# Patient Record
Sex: MALE | Race: OTHER | HISPANIC OR LATINO
[De-identification: names, ages, dates, MRNs, and addresses within clinical notes are randomized per-mention and may not be internally consistent; named-entity substitution may affect disease eponyms.]

---

## 2017-06-11 ENCOUNTER — TRANSCRIPTION ENCOUNTER (OUTPATIENT)
Age: 37
End: 2017-06-11

## 2017-08-13 ENCOUNTER — EMERGENCY (EMERGENCY)
Facility: HOSPITAL | Age: 37
LOS: 1 days | Discharge: HOME | End: 2017-08-13
Attending: EMERGENCY MEDICINE
Payer: COMMERCIAL

## 2017-08-13 VITALS
OXYGEN SATURATION: 96 % | DIASTOLIC BLOOD PRESSURE: 84 MMHG | RESPIRATION RATE: 16 BRPM | SYSTOLIC BLOOD PRESSURE: 137 MMHG | TEMPERATURE: 98 F | HEART RATE: 92 BPM

## 2017-08-13 VITALS
OXYGEN SATURATION: 98 % | HEIGHT: 73 IN | SYSTOLIC BLOOD PRESSURE: 143 MMHG | WEIGHT: 279.99 LBS | TEMPERATURE: 99 F | DIASTOLIC BLOOD PRESSURE: 99 MMHG | HEART RATE: 95 BPM | RESPIRATION RATE: 20 BRPM

## 2017-08-13 PROCEDURE — 73590 X-RAY EXAM OF LOWER LEG: CPT

## 2017-08-13 PROCEDURE — 73590 X-RAY EXAM OF LOWER LEG: CPT | Mod: 26,LT

## 2017-08-13 PROCEDURE — 73030 X-RAY EXAM OF SHOULDER: CPT

## 2017-08-13 PROCEDURE — 71045 X-RAY EXAM CHEST 1 VIEW: CPT

## 2017-08-13 PROCEDURE — 99284 EMERGENCY DEPT VISIT MOD MDM: CPT | Mod: 25

## 2017-08-13 PROCEDURE — 96372 THER/PROPH/DIAG INJ SC/IM: CPT

## 2017-08-13 PROCEDURE — 71010: CPT | Mod: 26

## 2017-08-13 PROCEDURE — 73030 X-RAY EXAM OF SHOULDER: CPT | Mod: 26,LT

## 2017-08-13 PROCEDURE — 99284 EMERGENCY DEPT VISIT MOD MDM: CPT

## 2017-08-13 RX ORDER — METFORMIN HYDROCHLORIDE 850 MG/1
0 TABLET ORAL
Qty: 0 | Refills: 0 | COMMUNITY

## 2017-08-13 RX ORDER — KETOROLAC TROMETHAMINE 30 MG/ML
15 SYRINGE (ML) INJECTION ONCE
Qty: 0 | Refills: 0 | Status: DISCONTINUED | OUTPATIENT
Start: 2017-08-13 | End: 2017-08-13

## 2017-08-13 RX ORDER — TRAMADOL HYDROCHLORIDE 50 MG/1
1 TABLET ORAL
Qty: 18 | Refills: 0 | OUTPATIENT
Start: 2017-08-13 | End: 2017-08-16

## 2017-08-13 RX ADMIN — Medication 15 MILLIGRAM(S): at 11:45

## 2017-08-13 NOTE — ED ADULT NURSE NOTE - CHIEF COMPLAINT QUOTE
Pt reports he was at a [employer] game yesterday with his family and he fell down approx 9 concrete steps. Pt braced his fall with his left side, c/o left shoulder and arm pain. Pt denies hitting head, denies blood thinners, denies LOC. Pt with no obvious head injury. Abrasion to left shin.

## 2017-08-13 NOTE — ED PROVIDER NOTE - MUSCULOSKELETAL, MLM
Spine appears normal, range of motion is not limited, no muscle or joint tendernessexcept tenderness over his left ac joint he can range with encouragement and laft lateral lower leg diffuse large abrasion no cellulitis

## 2017-08-13 NOTE — ED PROVIDER NOTE - OBJECTIVE STATEMENT
36 y/o male with ess no pmh states he was well until 36 y/o male with ess no pmh states he was well until yesterday he says he tripped and fell down about 9 steps and he put his left arm out to break his fall and c/o pain over his shoulder most prominent over his distal clavicle and it hurts to raise his arm as well as a large abrasion over his left lower leg no weakness or numbness and no head or neck injury

## 2017-08-13 NOTE — ED PROVIDER NOTE - CHPI ED SYMPTOMS NEG
no tingling/no loss of consciousness/no confusion/no deformity/no vomiting/no weakness/no bleeding/no numbness/no fever

## 2017-08-13 NOTE — ED PROVIDER NOTE - CARE PLAN
Principal Discharge DX:	Shoulder strain, left, initial encounter  Secondary Diagnosis:	Abrasion of left lower extremity, initial encounter

## 2017-08-13 NOTE — ED ADULT TRIAGE NOTE - CHIEF COMPLAINT QUOTE
Pt reports he was at a [employer] game yesterday with his family and he fell down approx 9 concrete steps. Pt braced his fall with his left side, c/o let shoulder and arm pain. Pt denies hitting head, denies blood thinners, denies LOC. Pt with no obvious head injury. Abrasion to left shin. Pt reports he was at a [employer] game yesterday with his family and he fell down approx 9 concrete steps. Pt braced his fall with his left side, c/o left shoulder and arm pain. Pt denies hitting head, denies blood thinners, denies LOC. Pt with no obvious head injury. Abrasion to left shin.

## 2018-01-11 ENCOUNTER — TRANSCRIPTION ENCOUNTER (OUTPATIENT)
Age: 38
End: 2018-01-11

## 2018-10-04 ENCOUNTER — TRANSCRIPTION ENCOUNTER (OUTPATIENT)
Age: 38
End: 2018-10-04

## 2018-10-09 ENCOUNTER — RX RENEWAL (OUTPATIENT)
Age: 38
End: 2018-10-09

## 2019-01-17 ENCOUNTER — TRANSCRIPTION ENCOUNTER (OUTPATIENT)
Age: 39
End: 2019-01-17

## 2019-01-23 ENCOUNTER — EMERGENCY (EMERGENCY)
Facility: HOSPITAL | Age: 39
LOS: 1 days | Discharge: HOME | End: 2019-01-23
Attending: STUDENT IN AN ORGANIZED HEALTH CARE EDUCATION/TRAINING PROGRAM
Payer: COMMERCIAL

## 2019-01-23 VITALS
RESPIRATION RATE: 18 BRPM | HEART RATE: 107 BPM | WEIGHT: 270.07 LBS | DIASTOLIC BLOOD PRESSURE: 89 MMHG | HEIGHT: 73 IN | OXYGEN SATURATION: 98 % | SYSTOLIC BLOOD PRESSURE: 135 MMHG | TEMPERATURE: 98 F

## 2019-01-23 PROBLEM — E11.9 TYPE 2 DIABETES MELLITUS WITHOUT COMPLICATIONS: Chronic | Status: ACTIVE | Noted: 2017-08-13

## 2019-01-23 PROCEDURE — 99283 EMERGENCY DEPT VISIT LOW MDM: CPT | Mod: 25

## 2019-01-23 PROCEDURE — 99283 EMERGENCY DEPT VISIT LOW MDM: CPT

## 2019-01-23 PROCEDURE — 73030 X-RAY EXAM OF SHOULDER: CPT | Mod: 26,LT

## 2019-01-23 PROCEDURE — 99053 MED SERV 10PM-8AM 24 HR FAC: CPT

## 2019-01-23 PROCEDURE — 73030 X-RAY EXAM OF SHOULDER: CPT

## 2019-01-23 RX ORDER — METFORMIN HYDROCHLORIDE 850 MG/1
0 TABLET ORAL
Qty: 0 | Refills: 0 | COMMUNITY

## 2019-01-23 RX ORDER — METHOCARBAMOL 500 MG/1
750 TABLET, FILM COATED ORAL ONCE
Qty: 0 | Refills: 0 | Status: COMPLETED | OUTPATIENT
Start: 2019-01-23 | End: 2019-01-23

## 2019-01-23 RX ORDER — IBUPROFEN 200 MG
600 TABLET ORAL ONCE
Qty: 0 | Refills: 0 | Status: COMPLETED | OUTPATIENT
Start: 2019-01-23 | End: 2019-01-23

## 2019-01-23 RX ORDER — LIRAGLUTIDE 6 MG/ML
0 INJECTION SUBCUTANEOUS
Qty: 0 | Refills: 0 | COMMUNITY

## 2019-01-23 RX ORDER — OXYCODONE AND ACETAMINOPHEN 5; 325 MG/1; MG/1
1 TABLET ORAL ONCE
Qty: 0 | Refills: 0 | Status: DISCONTINUED | OUTPATIENT
Start: 2019-01-23 | End: 2019-01-23

## 2019-01-23 RX ADMIN — OXYCODONE AND ACETAMINOPHEN 1 TABLET(S): 5; 325 TABLET ORAL at 04:50

## 2019-01-23 RX ADMIN — Medication 600 MILLIGRAM(S): at 04:50

## 2019-01-23 RX ADMIN — METHOCARBAMOL 750 MILLIGRAM(S): 500 TABLET, FILM COATED ORAL at 04:50
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
## 2019-01-23 NOTE — ED PROVIDER NOTE - PROGRESS NOTE DETAILS
xray reviewed b myself and attending W/o any bony abnormalities- attending did not recommended sling - pt has private orthopedic - been told f/u Within 1-2 days

## 2019-01-23 NOTE — ED PROVIDER NOTE - CONSTITUTIONAL, MLM
normal... Well appearing, well nourished, awake, alert, oriented to person, place, time/situation and in mild apparent distress due to left shoulder pain
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
## 2019-01-23 NOTE — ED PROVIDER NOTE - PHYSICAL EXAMINATION
left shoulder : Ant lateral mild TTP - ROM decreased - radial pulse + 2 , decreased strengths compare to right  - hand  grossly intact - contour of the left shoulder grossly normal/ +Supra spinatus spams noted  no cervical - thoracic and lumbar spine midline TTP    gait normal speech normal
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
## 2019-06-13 ENCOUNTER — RESULT CHARGE (OUTPATIENT)
Age: 39
End: 2019-06-13

## 2019-06-13 ENCOUNTER — APPOINTMENT (OUTPATIENT)
Dept: ENDOCRINOLOGY | Facility: CLINIC | Age: 39
End: 2019-06-13
Payer: COMMERCIAL

## 2019-06-13 VITALS
HEIGHT: 73 IN | OXYGEN SATURATION: 99 % | WEIGHT: 280 LBS | DIASTOLIC BLOOD PRESSURE: 80 MMHG | HEART RATE: 89 BPM | SYSTOLIC BLOOD PRESSURE: 128 MMHG | BODY MASS INDEX: 37.11 KG/M2

## 2019-06-13 LAB — GLUCOSE BLDC GLUCOMTR-MCNC: 348

## 2019-06-13 PROCEDURE — 82962 GLUCOSE BLOOD TEST: CPT

## 2019-06-13 PROCEDURE — 99214 OFFICE O/P EST MOD 30 MIN: CPT | Mod: 25

## 2019-06-13 NOTE — REVIEW OF SYSTEMS
[Polyuria] : polyuria [Pain/Numbness of Digits] : pain/numbness of digits [Palpitations] : no palpitations [Chest Pain] : no chest pain [Shortness Of Breath] : no shortness of breath [Nausea] : no nausea [Vomiting] : no vomiting was observed [Polydipsia] : no polydipsia [Abdominal Pain] : no abdominal pain [de-identified] : numbness in left pinky toe

## 2019-06-13 NOTE — ASSESSMENT
[Self Monitoring of Blood Glucose] : self monitoring of blood glucose [Long Term Vascular Complications] : long term vascular complications of diabetes [FreeTextEntry1] : 39 year old male with uncontrolled T2DM, hypertension, and hyperlipidemia. Glycemic control is likely poor due to nonaherence with medication regimen and glucose monitoring.\par \par 1. T2DM\par -Reviewed risks and complications of uncontrolled diabetes in depth.\par -Restart Tresiba 10 units daily.\par -Must start SMBG 4x daily. Suggest use of Freestyle Patricia for better evaluation of glucose patterns and more consistent monitoring. Patient is hesitant (thinks device will fall off due to his physical). Will attempt use of device and if incompatible with his job, will resume fingersticks.\par -Check A1c now.\par -Continue Metformin.\par -Consider restarting GLP agonist or prandial insulin based on results of labs.\par \par 2. Hypertension- controlled.\par \par 3. Hyperlipidemia- not on statin.\par -Check lipids now.

## 2019-06-13 NOTE — HISTORY OF PRESENT ILLNESS
[FreeTextEntry1] : Type: 2\par Severity: uncontrolled\par Duration: diagnosed 2016\par Associated symptoms: hyperglycemia\par Modifying factors: worse due to nonadherence with medication\par \par Current meds for glycemic control:\par Metformin 1000 mg BID\par Victoza 1.8 mg daily- stopped when he started Tresiba \par Tresiba- cannot remember dose, stopped taking months ago\par SMBG only if feeling "weak", about 2-3x per week. 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

## 2019-06-13 NOTE — PHYSICAL EXAM
[Alert] : alert [No Acute Distress] : no acute distress [Well Developed] : well developed [Well Nourished] : well nourished [Normal Sclera/Conjunctiva] : normal sclera/conjunctiva [EOMI] : extra ocular movement intact [No Proptosis] : no proptosis [Normal Oropharynx] : the oropharynx was normal [Thyroid Not Enlarged] : the thyroid was not enlarged [No Thyroid Nodules] : there were no palpable thyroid nodules [No Accessory Muscle Use] : no accessory muscle use [No Respiratory Distress] : no respiratory distress [Clear to Auscultation] : lungs were clear to auscultation bilaterally [Normal S1, S2] : normal S1 and S2 [Normal Rate] : heart rate was normal  [Regular Rhythm] : with a regular rhythm [No Edema] : there was no peripheral edema [Acanthosis Nigricans] : acanthosis nigricans [Oriented x3] : oriented to person, place, and time [de-identified] : diaphoretic [de-identified] : patient appears indifferent, minimally participative in visit.

## 2019-06-14 ENCOUNTER — MEDICATION RENEWAL (OUTPATIENT)
Age: 39
End: 2019-06-14

## 2019-06-14 LAB
ALBUMIN SERPL ELPH-MCNC: 5.1 G/DL
ALP BLD-CCNC: 94 U/L
ALT SERPL-CCNC: 86 U/L
ANION GAP SERPL CALC-SCNC: 14 MMOL/L
AST SERPL-CCNC: 40 U/L
BASOPHILS # BLD AUTO: 0.06 K/UL
BASOPHILS NFR BLD AUTO: 0.7 %
BILIRUB SERPL-MCNC: 0.4 MG/DL
BUN SERPL-MCNC: 19 MG/DL
CALCIUM SERPL-MCNC: 10.2 MG/DL
CHLORIDE SERPL-SCNC: 97 MMOL/L
CHOLEST SERPL-MCNC: 212 MG/DL
CHOLEST/HDLC SERPL: 5.2 RATIO
CO2 SERPL-SCNC: 24 MMOL/L
CREAT SERPL-MCNC: 0.76 MG/DL
CREAT SPEC-SCNC: 71 MG/DL
EOSINOPHIL # BLD AUTO: 0.3 K/UL
EOSINOPHIL NFR BLD AUTO: 3.3 %
ESTIMATED AVERAGE GLUCOSE: 280 MG/DL
GLUCOSE SERPL-MCNC: 343 MG/DL
HBA1C MFR BLD HPLC: 11.4 %
HCT VFR BLD CALC: 46 %
HDLC SERPL-MCNC: 41 MG/DL
HGB BLD-MCNC: 15 G/DL
IMM GRANULOCYTES NFR BLD AUTO: 0.4 %
LDLC SERPL CALC-MCNC: 134 MG/DL
LYMPHOCYTES # BLD AUTO: 2.66 K/UL
LYMPHOCYTES NFR BLD AUTO: 29 %
MAN DIFF?: NORMAL
MCHC RBC-ENTMCNC: 30.3 PG
MCHC RBC-ENTMCNC: 32.6 GM/DL
MCV RBC AUTO: 92.9 FL
MICROALBUMIN 24H UR DL<=1MG/L-MCNC: 27.5 MG/DL
MICROALBUMIN/CREAT 24H UR-RTO: 389 MG/G
MONOCYTES # BLD AUTO: 0.78 K/UL
MONOCYTES NFR BLD AUTO: 8.5 %
NEUTROPHILS # BLD AUTO: 5.34 K/UL
NEUTROPHILS NFR BLD AUTO: 58.1 %
PLATELET # BLD AUTO: 284 K/UL
POTASSIUM SERPL-SCNC: 4.6 MMOL/L
PROT SERPL-MCNC: 8.4 G/DL
RBC # BLD: 4.95 M/UL
RBC # FLD: 13.3 %
SODIUM SERPL-SCNC: 135 MMOL/L
TRIGL SERPL-MCNC: 183 MG/DL
TSH SERPL-ACNC: 1.22 UIU/ML
WBC # FLD AUTO: 9.18 K/UL

## 2019-07-11 NOTE — ED PROVIDER NOTE - CONDUCTED A DETAILED DISCUSSION WITH PATIENT AND/OR GUARDIAN REGARDING, MDM
Your current Orthopaedic problem we are working together to treat is pain.    - PLEASE CONTACT OFFICE BEFORE STOPPING ANY MEDICATION PRESCRIBED BY DR. BRIONES.   - Please allow 72 hours for all refill requests.  We will not refill any pain medicine after business hours or on weekends. Thank you!    It is recommended you schedule a follow-up appointment with Vladislav Briones, DO in 2 months    We do highly recommend [facility], if you do not already have this. You can request access via the internet or by simply talking with a  at any of the clinics.   [email or web address].    Office hours are 8:00 am to 5:00 pm Monday through Friday. If it is urgent that you speak with someone outside of these hours, our [facility] Call Center will be able to assist you.   If you have any questions or concerns prior to your next office visit, please call our Pain Line: [phone].     Thank you for choosing Dr. Vladislav Briones as your [facility] Pain Management provider!               
f/u with ortho ( private )/radiology results/need for outpatient follow-up

## 2019-07-23 ENCOUNTER — APPOINTMENT (OUTPATIENT)
Dept: ENDOCRINOLOGY | Facility: CLINIC | Age: 39
End: 2019-07-23

## 2019-07-31 ENCOUNTER — TRANSCRIPTION ENCOUNTER (OUTPATIENT)
Age: 39
End: 2019-07-31

## 2019-08-30 ENCOUNTER — APPOINTMENT (OUTPATIENT)
Dept: ENDOCRINOLOGY | Facility: CLINIC | Age: 39
End: 2019-08-30

## 2019-09-11 ENCOUNTER — RX RENEWAL (OUTPATIENT)
Age: 39
End: 2019-09-11

## 2019-10-21 ENCOUNTER — TRANSCRIPTION ENCOUNTER (OUTPATIENT)
Age: 39
End: 2019-10-21

## 2019-11-01 ENCOUNTER — APPOINTMENT (OUTPATIENT)
Dept: ENDOCRINOLOGY | Facility: CLINIC | Age: 39
End: 2019-11-01

## 2019-12-10 ENCOUNTER — EMERGENCY (EMERGENCY)
Facility: HOSPITAL | Age: 39
LOS: 1 days | Discharge: HOME | End: 2019-12-10
Attending: EMERGENCY MEDICINE
Payer: COMMERCIAL

## 2019-12-10 VITALS
WEIGHT: 270.07 LBS | OXYGEN SATURATION: 97 % | DIASTOLIC BLOOD PRESSURE: 98 MMHG | HEIGHT: 73 IN | SYSTOLIC BLOOD PRESSURE: 161 MMHG | HEART RATE: 104 BPM | RESPIRATION RATE: 20 BRPM | TEMPERATURE: 100 F

## 2019-12-10 VITALS
SYSTOLIC BLOOD PRESSURE: 147 MMHG | HEART RATE: 92 BPM | OXYGEN SATURATION: 97 % | DIASTOLIC BLOOD PRESSURE: 100 MMHG | RESPIRATION RATE: 20 BRPM | TEMPERATURE: 100 F

## 2019-12-10 PROCEDURE — 99284 EMERGENCY DEPT VISIT MOD MDM: CPT | Mod: 25

## 2019-12-10 PROCEDURE — 96372 THER/PROPH/DIAG INJ SC/IM: CPT

## 2019-12-10 PROCEDURE — 99283 EMERGENCY DEPT VISIT LOW MDM: CPT

## 2019-12-10 PROCEDURE — 73610 X-RAY EXAM OF ANKLE: CPT | Mod: 26,LT

## 2019-12-10 PROCEDURE — 99053 MED SERV 10PM-8AM 24 HR FAC: CPT

## 2019-12-10 PROCEDURE — 73590 X-RAY EXAM OF LOWER LEG: CPT | Mod: 26,LT

## 2019-12-10 PROCEDURE — 73630 X-RAY EXAM OF FOOT: CPT

## 2019-12-10 PROCEDURE — 73590 X-RAY EXAM OF LOWER LEG: CPT

## 2019-12-10 PROCEDURE — 73610 X-RAY EXAM OF ANKLE: CPT

## 2019-12-10 PROCEDURE — 73630 X-RAY EXAM OF FOOT: CPT | Mod: 26,LT

## 2019-12-10 RX ORDER — KETOROLAC TROMETHAMINE 30 MG/ML
60 SYRINGE (ML) INJECTION ONCE
Refills: 0 | Status: DISCONTINUED | OUTPATIENT
Start: 2019-12-10 | End: 2019-12-10

## 2019-12-10 RX ADMIN — Medication 60 MILLIGRAM(S): at 08:16

## 2019-12-10 NOTE — ED PROVIDER NOTE - CARE PROVIDER_API CALL
Christian Murcia (DPM)  Podiatric Medicine and Surgery  [address] [place] [address]  Phone: [phone]  Fax: [phone]  Follow Up Time:

## 2019-12-10 NOTE — ED STATDOCS - OBJECTIVE STATEMENT
40 y/o M pt with significant PMHx of DM presents to the ED c/o left ankle pain s/p trauma. 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 Negative LOC, negative Head trauma. Patient currently taking Metformin for his DM. Denies HA, SOB, fever. No further acute complaints at this time.

## 2019-12-10 NOTE — ED PROVIDER NOTE - PHYSICAL EXAMINATION
Constitutional - well-developed; well nourished. Head - NCAT. Airway patent. Eyes - PERRL. CV - RRR. no murmur. no edema. Pulm - CTAB. Abd - soft, nt. no rebound. no guarding. Neuro - A&Ox3. strength 5/5 x4. sensation intact x4. normal gait. Skin - No rash. MSK - limited ROM, +TTP along base of L 1st digit/second digit and third digit, no echymosis, minimal soft tissue swelling, DP and PT pulses intact, no ankle tenderness, no calf tenderness or knee tenderness, skin warm and dry.
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
## 2019-12-10 NOTE — ED PROVIDER NOTE - PATIENT PORTAL LINK FT
You can access the FollowMyHealth Patient Portal offered by [facility] by registering at the following website: [email or web address]. By joining [facility]’s FollowMyHealth portal, you will also be able to view your health information using other applications (apps) compatible with our system.

## 2019-12-10 NOTE — ED STATDOCS - ATTENDING CONTRIBUTION TO CARE
I, Brandon Mendoza, performed the initial face to face bedside interview with this patient regarding history of present illness, review of symptoms and relevant past medical, social and family history.  I completed an independent physical examination.  I was the initial provider who evaluated this patient. I have signed out the follow up of any pending tests (i.e. labs, radiological studies) to the ACP.  I have communicated the patient’s plan of care and disposition with the ACP.  The history, relevant review of systems, past medical and surgical history, medical decision making, and physical examination was documented by the scribe in my presence and I attest to the accuracy of the documentation.

## 2019-12-10 NOTE — ED ADULT NURSE NOTE - NSIMPLEMENTINTERV_GEN_ALL_ED
Implemented All Fall Risk Interventions:  [place] to call system. Call bell, personal items and telephone within reach. Instruct patient to call for assistance. Room bathroom lighting operational. Non-slip footwear when patient is off stretcher. Physically safe environment: no spills, clutter or unnecessary equipment. Stretcher in lowest position, wheels locked, appropriate side rails in place. Provide visual cue, wrist band, yellow gown, etc. Monitor gait and stability. Monitor for mental status changes and reorient to person, place, and time. Review medications for side effects contributing to fall risk. Reinforce activity limits and safety measures with patient and family.

## 2019-12-10 NOTE — ED PROVIDER NOTE - ATTENDING CONTRIBUTION TO CARE
The patient seen and examined    Ankle/foot pain    I, Brandon Mendoza, performed the initial face to face bedside interview with this patient regarding history of present illness, review of symptoms and relevant past medical, social and family history.  I completed an independent physical examination.  I was the initial provider who evaluated this patient. I have signed out the follow up of any pending tests (i.e. labs, radiological studies) to the ACP.  I have communicated the patient’s plan of care and disposition with the ACP.

## 2019-12-10 NOTE — ED PROVIDER NOTE - OBJECTIVE STATEMENT
This is a 39 year old male with c/o L LE pain.  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  He notes during the interim his steel toe boot was caught.  He notes twisted his foot.  He reports was able to walk on his heel.  He was sent home early from work.  He reports drove himself to ED.  He denies taking any prior injury to LE.  He denies using ice or taking any pain medication.

## 2019-12-10 NOTE — ED ADULT TRIAGE NOTE - CHIEF COMPLAINT QUOTE
patient states at work on a high/low got foot caught on palette twisting foot backwards complaining of from ankle to calve, difficulty walking

## 2019-12-10 NOTE — ED PROVIDER NOTE - NS ED ROS FT
No fever/chills, No photophobia/eye pain/changes in vision, No ear pain/sore throat/dysphagia, No chest pain/palpitations, no SOB/cough/wheeze/stridor, No abdominal pain, No N/V/D, no dysuria/frequency/discharge, +L pain, No neck/back pain, no rash, no changes in neurological status/function.

## 2019-12-10 NOTE — ED STATDOCS - MUSCULOSKELETAL, MLM
(+) Tenderness over left malleolus. (+) Tenderness to palpation over the left first metatarsal bone. range of motion is not limited

## 2020-06-25 ENCOUNTER — APPOINTMENT (OUTPATIENT)
Dept: INTERNAL MEDICINE | Facility: CLINIC | Age: 40
End: 2020-06-25
Payer: COMMERCIAL

## 2020-06-25 ENCOUNTER — NON-APPOINTMENT (OUTPATIENT)
Age: 40
End: 2020-06-25

## 2020-06-25 VITALS
SYSTOLIC BLOOD PRESSURE: 142 MMHG | HEART RATE: 107 BPM | DIASTOLIC BLOOD PRESSURE: 88 MMHG | OXYGEN SATURATION: 97 % | HEIGHT: 73 IN | WEIGHT: 262 LBS | RESPIRATION RATE: 14 BRPM | BODY MASS INDEX: 34.72 KG/M2

## 2020-06-25 DIAGNOSIS — G89.29 PAIN IN LEFT SHOULDER: ICD-10-CM

## 2020-06-25 DIAGNOSIS — M25.512 PAIN IN LEFT SHOULDER: ICD-10-CM

## 2020-06-25 DIAGNOSIS — Z00.00 ENCOUNTER FOR GENERAL ADULT MEDICAL EXAMINATION W/OUT ABNORMAL FINDINGS: ICD-10-CM

## 2020-06-25 DIAGNOSIS — F17.200 NICOTINE DEPENDENCE, UNSPECIFIED, UNCOMPLICATED: ICD-10-CM

## 2020-06-25 DIAGNOSIS — F31.9 BIPOLAR DISORDER, UNSPECIFIED: ICD-10-CM

## 2020-06-25 DIAGNOSIS — R68.82 DECREASED LIBIDO: ICD-10-CM

## 2020-06-25 DIAGNOSIS — Z83.3 FAMILY HISTORY OF DIABETES MELLITUS: ICD-10-CM

## 2020-06-25 DIAGNOSIS — N52.9 MALE ERECTILE DYSFUNCTION, UNSPECIFIED: ICD-10-CM

## 2020-06-25 DIAGNOSIS — Z82.0 FAMILY HISTORY OF EPILEPSY AND OTHER DISEASES OF THE NERVOUS SYSTEM: ICD-10-CM

## 2020-06-25 PROCEDURE — 93000 ELECTROCARDIOGRAM COMPLETE: CPT | Mod: 59

## 2020-06-25 PROCEDURE — 99386 PREV VISIT NEW AGE 40-64: CPT | Mod: 25

## 2020-06-25 PROCEDURE — G0442 ANNUAL ALCOHOL SCREEN 15 MIN: CPT

## 2020-06-25 PROCEDURE — 36415 COLL VENOUS BLD VENIPUNCTURE: CPT

## 2020-06-25 RX ORDER — MIRTAZAPINE 15 MG/1
15 TABLET, FILM COATED ORAL
Refills: 0 | Status: ACTIVE | COMMUNITY
Start: 2020-06-25

## 2020-06-26 LAB
25(OH)D3 SERPL-MCNC: 14.5 NG/ML
ALBUMIN SERPL ELPH-MCNC: 5 G/DL
ALP BLD-CCNC: 148 U/L
ALT SERPL-CCNC: 73 U/L
ANION GAP SERPL CALC-SCNC: 17 MMOL/L
AST SERPL-CCNC: 31 U/L
BASOPHILS # BLD AUTO: 0.05 K/UL
BASOPHILS NFR BLD AUTO: 0.6 %
BILIRUB SERPL-MCNC: 0.5 MG/DL
BUN SERPL-MCNC: 13 MG/DL
CALCIUM SERPL-MCNC: 10.5 MG/DL
CHLORIDE SERPL-SCNC: 95 MMOL/L
CHOLEST SERPL-MCNC: 221 MG/DL
CHOLEST/HDLC SERPL: 5.3 RATIO
CO2 SERPL-SCNC: 24 MMOL/L
CREAT SERPL-MCNC: 0.74 MG/DL
CREAT SPEC-SCNC: 41 MG/DL
EOSINOPHIL # BLD AUTO: 0.16 K/UL
EOSINOPHIL NFR BLD AUTO: 1.8 %
ESTIMATED AVERAGE GLUCOSE: 329 MG/DL
GLUCOSE SERPL-MCNC: 470 MG/DL
HBA1C MFR BLD HPLC: 13.1 %
HCT VFR BLD CALC: 49.9 %
HDLC SERPL-MCNC: 41 MG/DL
HGB BLD-MCNC: 15.9 G/DL
IMM GRANULOCYTES NFR BLD AUTO: 0.2 %
LDLC SERPL CALC-MCNC: 113 MG/DL
LYMPHOCYTES # BLD AUTO: 2.43 K/UL
LYMPHOCYTES NFR BLD AUTO: 28 %
MAN DIFF?: NORMAL
MCHC RBC-ENTMCNC: 30.8 PG
MCHC RBC-ENTMCNC: 31.9 GM/DL
MCV RBC AUTO: 96.5 FL
MICROALBUMIN 24H UR DL<=1MG/L-MCNC: 31.7 MG/DL
MICROALBUMIN/CREAT 24H UR-RTO: 778 MG/G
MONOCYTES # BLD AUTO: 0.71 K/UL
MONOCYTES NFR BLD AUTO: 8.2 %
NEUTROPHILS # BLD AUTO: 5.3 K/UL
NEUTROPHILS NFR BLD AUTO: 61.2 %
PLATELET # BLD AUTO: 284 K/UL
POTASSIUM SERPL-SCNC: 4.5 MMOL/L
PROT SERPL-MCNC: 8.1 G/DL
RBC # BLD: 5.17 M/UL
RBC # FLD: 12.9 %
SODIUM SERPL-SCNC: 135 MMOL/L
TRIGL SERPL-MCNC: 331 MG/DL
WBC # FLD AUTO: 8.67 K/UL

## 2020-06-26 RX ORDER — METFORMIN HYDROCHLORIDE 1000 MG/1
1000 TABLET, COATED ORAL
Qty: 60 | Refills: 0 | Status: ACTIVE | COMMUNITY
Start: 2019-06-14 | End: 1900-01-01

## 2020-06-26 NOTE — ASSESSMENT
[FreeTextEntry1] : -PMH: HTN, HLD, T2DM, Bipolar Depression\par -SH: Single. 1 child. . Current smoker. Occasional EtOH use. \par \par NADEEN is a 40 year M whom is here today for an annual well check and to establish care with a new PMD. \par Today, pt reports feeling well but he does mention low libido and difficulties maintaining and attaining an erection\par \par Follows with the following Physicians: \par -Endo: Dr. Bello ([phone])\par -Psych: Dr. Meraz ([phone])\par \par -Vaccines: Needs PPSV 23, TDap (Declines both at this time and will check records at home)\par \par EKG obtained in office today demonstrates NSR. Normal Axis/Intervals. Good R-wave progression. Normal EKG\par \par -F/u labs drawn in office today\par -Further recs pending lab results\par -Continue f/u w/ Endo (T2DM)\par -Continue f/u w/ Psych (Bipolar Depression)\par -RTO 3mo for routine f/u visit & labs & TDap/PPSV23, Optho ref, COnfirm med rec, Ortho ref

## 2020-06-26 NOTE — ADDENDUM
[FreeTextEntry1] : A1c 13.1\par CMP: Glucose 470, ALT 73, \par Urine Microalbumin 778\par Vit-D 14.5\par CBC WNL\par \par #1 Uncontrolled T2DM: A1c 13.1. Likely the cause of his erectile dysfunction. He NEEDS to f/u w/ his Endocrinologist to discuss med adjustments. I advised he needs to call them today. If has has difficulty getting in touch he is to call me by the end of the day so that we can make med adjustments. I have advised he increase his water consumption as elevated sugars can lead to dehydration\par \par #2 Proteinuria likely 2/2 uncontrolled T2DM. WIll continue to monitor and repeat in 3mo.\par \par #3 Vit-D Def: Recommend supplementation of Vit-D3 50,000iu Qwkly x 8 weeks THEN maintence w/ Vit-D3 2,000iu QD\par \par Recommend starting Atorvastatin 40mg HS & Lisinopril 10mg\par \par F/u in 1mo for BP check

## 2020-06-26 NOTE — HEALTH RISK ASSESSMENT
[Very Good] : ~his/her~  mood as very good [Yes] : Yes [None] : None [16-20] : 16-20 [Monthly or less (1 pt)] : Monthly or less (1 point) [Never (0 pts)] : Never (0 points) [1 or 2 (0 pts)] : 1 or 2 (0 points) [No] : In the past 12 months have you used drugs other than those required for medical reasons? No [0] : 1) Little interest or pleasure doing things: Not at all (0) [Hepatitis C test declined] : Hepatitis C test declined [HIV test declined] : HIV test declined [Employed] : employed [With Family] : lives with family [# Of Children ___] : has [unfilled] children [Sexually Active] : sexually active [Single] : single [Reviewed no changes] : Reviewed no changes [] : No [de-identified] : 1 cig per day [Audit-CScore] : 1 [[id number]] : 0 [Change in mental status noted] : No change in mental status noted [High Risk Behavior] : no high risk behavior [AdvancecareDate] : 06/20

## 2020-06-26 NOTE — HISTORY OF PRESENT ILLNESS
[FreeTextEntry1] : Annual well visit [de-identified] : -PMH: HTN, HLD, T2DM, Bipolar Depression\par -SH: Single. 2 children. . Current smoker. Occasional EtOH use. \par \par NADEEN is a 40 year M whom is here today for an annual well check and to establish care with a new PMD. \par Today, pt reports feeling well but he does mention low libido and difficulties maintaining and attaining an erection\par \par Follows with the following Physicians: \par -Endo: Dr. Bello ([phone])\par -Psych: Dr. Meraz ([phone])\par \par -Vaccines: Needs PPSV 23, TDap (Declines both at this time and will check records at home)\par \par -HTN: Not on medication at this time. No reported changes. \par -T2DM: Remains on Metformin 1000mg BID & Tresiba. Follows w/ Endo. Has not seen optho. No reported changes. \par -Bipolar Depression: Remains on Remeron 15mg QD. Follows w/ Psych. No reported changes.

## 2020-06-30 LAB
TESTOST BND SERPL-MCNC: 11.3 PG/ML
TESTOST SERPL-MCNC: 267.2 NG/DL

## 2020-08-13 ENCOUNTER — APPOINTMENT (OUTPATIENT)
Dept: ENDOCRINOLOGY | Facility: CLINIC | Age: 40
End: 2020-08-13
Payer: COMMERCIAL

## 2020-08-13 VITALS
BODY MASS INDEX: 35.39 KG/M2 | HEIGHT: 73 IN | WEIGHT: 267 LBS | DIASTOLIC BLOOD PRESSURE: 100 MMHG | SYSTOLIC BLOOD PRESSURE: 150 MMHG

## 2020-08-13 LAB
BASOPHILS # BLD AUTO: 0.05 K/UL
BASOPHILS NFR BLD AUTO: 0.6 %
EOSINOPHIL # BLD AUTO: 0.17 K/UL
EOSINOPHIL NFR BLD AUTO: 2 %
HCT VFR BLD CALC: 43.9 %
HGB BLD-MCNC: 14.3 G/DL
IMM GRANULOCYTES NFR BLD AUTO: 0.3 %
LYMPHOCYTES # BLD AUTO: 2.32 K/UL
LYMPHOCYTES NFR BLD AUTO: 26.9 %
MAN DIFF?: NORMAL
MCHC RBC-ENTMCNC: 30.2 PG
MCHC RBC-ENTMCNC: 32.6 GM/DL
MCV RBC AUTO: 92.6 FL
MONOCYTES # BLD AUTO: 0.75 K/UL
MONOCYTES NFR BLD AUTO: 8.7 %
NEUTROPHILS # BLD AUTO: 5.31 K/UL
NEUTROPHILS NFR BLD AUTO: 61.5 %
PLATELET # BLD AUTO: 259 K/UL
RBC # BLD: 4.74 M/UL
RBC # FLD: 12.7 %
WBC # FLD AUTO: 8.63 K/UL

## 2020-08-13 PROCEDURE — 99214 OFFICE O/P EST MOD 30 MIN: CPT

## 2020-08-13 RX ORDER — LISINOPRIL 10 MG/1
10 TABLET ORAL
Qty: 90 | Refills: 0 | Status: DISCONTINUED | COMMUNITY
Start: 2020-06-26 | End: 2020-08-13

## 2020-08-13 RX ORDER — ATORVASTATIN CALCIUM 40 MG/1
40 TABLET, FILM COATED ORAL
Qty: 90 | Refills: 1 | Status: DISCONTINUED | COMMUNITY
Start: 2020-06-26 | End: 2020-08-13

## 2020-08-13 RX ORDER — FLASH GLUCOSE SCANNING READER
EACH MISCELLANEOUS
Qty: 1 | Refills: 0 | Status: DISCONTINUED | COMMUNITY
Start: 2019-06-13 | End: 2020-08-13

## 2020-08-13 NOTE — PHYSICAL EXAM
[Well Nourished] : well nourished [Alert] : alert [Well Developed] : well developed [No Acute Distress] : no acute distress [No Proptosis] : no proptosis [Normal Sclera/Conjunctiva] : normal sclera/conjunctiva [EOMI] : extra ocular movement intact [Thyroid Not Enlarged] : the thyroid was not enlarged [No Thyroid Nodules] : no palpable thyroid nodules [No Accessory Muscle Use] : no accessory muscle use [No Respiratory Distress] : no respiratory distress [Clear to Auscultation] : lungs were clear to auscultation bilaterally [Normal S1, S2] : normal S1 and S2 [Normal Rate] : heart rate was normal [Regular Rhythm] : with a regular rhythm [No Edema] : no peripheral edema [Normal Anterior Cervical Nodes] : no anterior cervical lymphadenopathy [Normal Posterior Cervical Nodes] : no posterior cervical lymphadenopathy [No Stigmata of Cushings Syndrome] : no stigmata of Cushings Syndrome [Acanthosis Nigricans] : acanthosis nigricans present [Oriented x3] : oriented to person, place, and time [Normal Affect] : the affect was normal [Normal Mood] : the mood was normal

## 2020-08-13 NOTE — ASSESSMENT
[FreeTextEntry1] : 39 year old male with uncontrolled T2DM, hypertension, and hyperlipidemia. Glycemic control is poor.\par \par 1. T2DM- A1c 13.1%.\par -Reviewed risks and complications of uncontrolled diabetes.\par -Continue Tresiba 10 units daily and Metformin.\par -Not a good candidate for prandial insulin- only eats one meal daily and concern for hypoglycemia during physical labor at work.\par -Start Victoza 0.6 mg daily x 1 week, then increase to 1.2 mg daily x 1 week, then increase to 1.8 mg daily.\par -Must increase SMBG to 4x daily. Again suggest use of Freestyle Patricia for better evaluation of glucose patterns.\par -Discussed importance of lifestyle modifications- diet, exercise, and weight loss- in improving glycemic control.\par -Repeat A1c and RTO for follow-up in 6 weeks.\par \par 2. Hypertension- LDL above goal off statin.\par -Restart atorvastatin 20 mg daily.\par \par 3. Hypertension- elevated today. Ate a salty snack last night.\par -Low salt diet.\par -Resume lisinopril 10 mg daily.

## 2020-08-13 NOTE — HISTORY OF PRESENT ILLNESS
[FreeTextEntry1] : Presents today for follow-up T2DM, hypertension, hyperlipidemia. Has not been seen in this office since June 2019.\par \par Type: 2\par Severity: uncontrolled\par Duration: diagnosed April 2016\par Associated symptoms: hyperglycemia\par Modifying factors: worse due to nonadherence with medication\par \par Current meds for glycemic control:\par Metformin 1000 mg BID\par Tresiba 10 units daily\par Victoza 1.8 mg daily- stopped when he started Tresiba \par SMBG "when I feel funny"- feels weak, had low of 90 on keto diet.\par \par Complications:\par Last eye exam: unable to remember the last time he had an eye exam\par Last foot exam: never\par Kidney disease: none\par Heart disease: no CAD, CVA, MI\par \par Lifestyle\par Weight: stable\par Exercise: started jogging 2 miles 3x per week for the past 1.5 months.\par Diet: keto diet, low carb. No rice, no bread. Eating mostly meat, cheese, salad. Ate salted sunflower seeds 12 hours ago.\par Employed: physical job- unloads and loads trucks, works overnight

## 2020-08-13 NOTE — REVIEW OF SYSTEMS
[Fatigue] : fatigue [Blurred Vision] : blurred vision [Pain/Numbness of Digits] : pain/numbness of digits [Recent Weight Gain (___ Lbs)] : no recent weight gain [Chest Pain] : no chest pain [Recent Weight Loss (___ Lbs)] : no recent weight loss [Palpitations] : no palpitations [Shortness Of Breath] : no shortness of breath [Abdominal Pain] : no abdominal pain [Vomiting] : no vomiting [Nausea] : no nausea [de-identified] : left foot numbness and tingling since injury one year ago [Polyuria] : no polyuria [Polydipsia] : no polydipsia

## 2020-08-14 LAB
ALBUMIN SERPL ELPH-MCNC: 4.6 G/DL
ALP BLD-CCNC: 68 U/L
ALT SERPL-CCNC: 42 U/L
ANION GAP SERPL CALC-SCNC: 12 MMOL/L
AST SERPL-CCNC: 22 U/L
BILIRUB SERPL-MCNC: 0.6 MG/DL
BUN SERPL-MCNC: 13 MG/DL
CALCIUM SERPL-MCNC: 9.7 MG/DL
CHLORIDE SERPL-SCNC: 103 MMOL/L
CHOLEST SERPL-MCNC: 174 MG/DL
CHOLEST/HDLC SERPL: 4 RATIO
CO2 SERPL-SCNC: 28 MMOL/L
CREAT SERPL-MCNC: 0.73 MG/DL
ESTIMATED AVERAGE GLUCOSE: 240 MG/DL
GLUCOSE SERPL-MCNC: 164 MG/DL
HBA1C MFR BLD HPLC: 10 %
HDLC SERPL-MCNC: 44 MG/DL
LDLC SERPL CALC-MCNC: 108 MG/DL
POTASSIUM SERPL-SCNC: 4.2 MMOL/L
PROT SERPL-MCNC: 7.3 G/DL
SODIUM SERPL-SCNC: 143 MMOL/L
TRIGL SERPL-MCNC: 111 MG/DL

## 2020-09-04 ENCOUNTER — APPOINTMENT (OUTPATIENT)
Dept: INTERNAL MEDICINE | Facility: CLINIC | Age: 40
End: 2020-09-04
Payer: COMMERCIAL

## 2020-09-04 VITALS
WEIGHT: 266 LBS | DIASTOLIC BLOOD PRESSURE: 84 MMHG | BODY MASS INDEX: 35.25 KG/M2 | OXYGEN SATURATION: 96 % | HEART RATE: 85 BPM | TEMPERATURE: 97.3 F | RESPIRATION RATE: 14 BRPM | HEIGHT: 73 IN | SYSTOLIC BLOOD PRESSURE: 132 MMHG

## 2020-09-04 DIAGNOSIS — Z23 ENCOUNTER FOR IMMUNIZATION: ICD-10-CM

## 2020-09-04 DIAGNOSIS — R74.0 NONSPECIFIC ELEVATION OF LEVELS OF TRANSAMINASE AND LACTIC ACID DEHYDROGENASE [LDH]: ICD-10-CM

## 2020-09-04 DIAGNOSIS — R80.9 PROTEINURIA, UNSPECIFIED: ICD-10-CM

## 2020-09-04 PROCEDURE — 90471 IMMUNIZATION ADMIN: CPT

## 2020-09-04 PROCEDURE — 90715 TDAP VACCINE 7 YRS/> IM: CPT

## 2020-09-04 PROCEDURE — 99214 OFFICE O/P EST MOD 30 MIN: CPT | Mod: 25

## 2020-09-04 RX ORDER — ERGOCALCIFEROL 1.25 MG/1
1.25 MG CAPSULE, LIQUID FILLED ORAL
Qty: 12 | Refills: 1 | Status: DISCONTINUED | COMMUNITY
Start: 2020-08-13 | End: 2020-09-04

## 2020-09-04 NOTE — PHYSICAL EXAM
[No Acute Distress] : no acute distress [Well Nourished] : well nourished [Well Developed] : well developed [Well-Appearing] : well-appearing [Normal Sclera/Conjunctiva] : normal sclera/conjunctiva [PERRL] : pupils equal round and reactive to light [EOMI] : extraocular movements intact [Normal Outer Ear/Nose] : the outer ears and nose were normal in appearance [Normal Oropharynx] : the oropharynx was normal [No JVD] : no jugular venous distention [No Lymphadenopathy] : no lymphadenopathy [Supple] : supple [Thyroid Normal, No Nodules] : the thyroid was normal and there were no nodules present [No Respiratory Distress] : no respiratory distress  [No Accessory Muscle Use] : no accessory muscle use [Clear to Auscultation] : lungs were clear to auscultation bilaterally [Normal Rate] : normal rate  [Regular Rhythm] : with a regular rhythm [Normal S1, S2] : normal S1 and S2 [No Murmur] : no murmur heard [No Carotid Bruits] : no carotid bruits [No Abdominal Bruit] : a ~M bruit was not heard ~T in the abdomen [No Varicosities] : no varicosities [Pedal Pulses Present] : the pedal pulses are present [No Edema] : there was no peripheral edema [No Palpable Aorta] : no palpable aorta [No Extremity Clubbing/Cyanosis] : no extremity clubbing/cyanosis [Soft] : abdomen soft [Non Tender] : non-tender [Non-distended] : non-distended [No Masses] : no abdominal mass palpated [No HSM] : no HSM [Normal Bowel Sounds] : normal bowel sounds [Normal Posterior Cervical Nodes] : no posterior cervical lymphadenopathy [Normal Anterior Cervical Nodes] : no anterior cervical lymphadenopathy [No CVA Tenderness] : no CVA  tenderness [No Spinal Tenderness] : no spinal tenderness [No Joint Swelling] : no joint swelling [Grossly Normal Strength/Tone] : grossly normal strength/tone [No Rash] : no rash [Coordination Grossly Intact] : coordination grossly intact [No Focal Deficits] : no focal deficits [Normal Affect] : the affect was normal [Normal Gait] : normal gait [Normal Insight/Judgement] : insight and judgment were intact

## 2020-09-08 LAB
CREAT SPEC-SCNC: 101 MG/DL
MICROALBUMIN 24H UR DL<=1MG/L-MCNC: 4.1 MG/DL
MICROALBUMIN/CREAT 24H UR-RTO: 40 MG/G

## 2020-09-08 NOTE — ASSESSMENT
[FreeTextEntry1] : -PMH: HTN, HLD, T2DM, Bipolar Depression\par -SH: Single. 1 child. . Current smoker. Occasional EtOH use. \par \par NADEEN is a 40 year M whom is here today for f/u HTN\par \par Follows with the following Physicians: \par -Endo: Dr. Bello ([phone])\par -Psych: Dr. Meraz ([phone])\par \par TDap administered in office today\par \par -F/u Urine Microalbumin\par -Continue f/u w/ Endo (T2DM)\par -Continue f/u w/ Psych (Bipolar Depression)\par -RTO 3mo for routine f/u visit, labs & PPSV23, Optho ref

## 2020-09-08 NOTE — HISTORY OF PRESENT ILLNESS
[FreeTextEntry1] : f/u HTN [de-identified] : -PMH: HTN, HLD, T2DM, Bipolar Depression\par -SH: Single. 2 children. . Current smoker. Occasional EtOH use. \par \par NADEEN is a 40 year M whom is here today for f/u HTN\par Today, pt reports feeling well and is w/o complaints. He does mention that his wife was in the hospital 2/2 MVA\par He denies any changes since our last f/u visit\par Consents to the TDap vaccine today\par Declines the Flu Vaccine\par \par Of note, pt was started on Atorvastatin 40mg HS & Lisinopril 10mg at last visit\par \par -HTN: Now on Lisinopril 10mg. No reported changes. \par -HLD: Remains on Atorvastatin 20mg HS. No reported changes. \par -T2DM: Remains on Metformin 1000mg BID, Vitoza (Newly added) & Tresiba . (8/2020) A1c 10.0. Follows w/ Endo. Has not seen optho. No reported changes. \par -Bipolar Depression: Remains on Remeron 15mg QD. Follows w/ Psych. No reported changes

## 2020-09-08 NOTE — ADDENDUM
[FreeTextEntry1] : Proteinuria improved on Lisinopril. He is to c/w current med and we will continue to monitor

## 2020-09-23 RX ORDER — FLASH GLUCOSE SENSOR
KIT MISCELLANEOUS
Qty: 2 | Refills: 2 | Status: DISCONTINUED | COMMUNITY
Start: 2019-06-13 | End: 2020-09-23

## 2020-09-24 ENCOUNTER — APPOINTMENT (OUTPATIENT)
Dept: ENDOCRINOLOGY | Facility: CLINIC | Age: 40
End: 2020-09-24
Payer: COMMERCIAL

## 2020-09-24 VITALS
WEIGHT: 262 LBS | BODY MASS INDEX: 34.72 KG/M2 | DIASTOLIC BLOOD PRESSURE: 78 MMHG | HEIGHT: 73 IN | SYSTOLIC BLOOD PRESSURE: 112 MMHG

## 2020-09-24 PROCEDURE — 99214 OFFICE O/P EST MOD 30 MIN: CPT

## 2020-09-24 RX ORDER — FLASH GLUCOSE SENSOR
KIT MISCELLANEOUS
Qty: 2 | Refills: 2 | Status: DISCONTINUED | COMMUNITY
Start: 2020-08-13 | End: 2020-09-24

## 2020-09-24 RX ORDER — CHOLECALCIFEROL (VITAMIN D3) 1250 MCG
1.25 MG CAPSULE ORAL
Qty: 8 | Refills: 0 | Status: DISCONTINUED | COMMUNITY
Start: 2020-06-26 | End: 2020-09-24

## 2020-09-24 RX ORDER — PEN NEEDLE, DIABETIC 29 G X1/2"
32G X 4 MM NEEDLE, DISPOSABLE MISCELLANEOUS
Qty: 100 | Refills: 1 | Status: ACTIVE | COMMUNITY
Start: 2019-06-13 | End: 1900-01-01

## 2020-09-24 RX ORDER — LIRAGLUTIDE 6 MG/ML
18 INJECTION SUBCUTANEOUS
Qty: 1 | Refills: 1 | Status: DISCONTINUED | COMMUNITY
Start: 2020-08-13 | End: 2020-09-24

## 2020-09-24 RX ORDER — ATORVASTATIN CALCIUM 20 MG/1
20 TABLET, FILM COATED ORAL
Qty: 90 | Refills: 1 | Status: ACTIVE | COMMUNITY
Start: 2020-08-13 | End: 1900-01-01

## 2020-09-24 RX ORDER — LISINOPRIL 10 MG/1
10 TABLET ORAL
Qty: 90 | Refills: 1 | Status: ACTIVE | COMMUNITY
Start: 2020-08-13 | End: 1900-01-01

## 2020-09-24 NOTE — ASSESSMENT
[FreeTextEntry1] : 40 year old male with uncontrolled T2DM, hypertension, and hyperlipidemia. Glycemic control is suboptimal but starting to improve\par \par 1. T2DM- A1c 10% from 13.1%\par -Continue Tresiba 10 units daily and Metformin.\par -Not a good candidate for prandial insulin- only eats one meal daily and concern for hypoglycemia during physical labor at work.\par -Start Victoza 0.6 mg daily x 1 week, then increase to 1.2 mg daily x 1 week, then increase to 1.8 mg daily.\par -Must increase SMBG to 4x daily. Again suggest use of Freestyle Patricia for better evaluation of glucose patterns. Given Zymetis card to help with cost.\par -Discussed importance of lifestyle modifications- diet, exercise, and weight loss- in improving glycemic control.\par -Repeat A1c and RTO for follow-up in 6 weeks.\par \par 2. Hypertension\par -Continue statin.\par -Repeat lipids in 6 weeks.\par \par 3. Hypertension- controlled, ORI improving.\par -Continue lisinopril 10 mg daily. \par \par 4. Vitamin D deficiency- off supplement.\par -Check level with next labs.\par

## 2020-09-24 NOTE — REVIEW OF SYSTEMS
[Recent Weight Loss (___ Lbs)] : recent weight loss: [unfilled] lbs [Fatigue] : no fatigue [Blurred Vision] : no blurred vision [Chest Pain] : no chest pain [Palpitations] : no palpitations [Shortness Of Breath] : no shortness of breath [Nausea] : no nausea [Abdominal Pain] : no abdominal pain [Vomiting] : no vomiting [Polyuria] : no polyuria [Pain/Numbness of Digits] : no pain/numbness of digits [Polydipsia] : no polydipsia

## 2020-09-24 NOTE — HISTORY OF PRESENT ILLNESS
[FreeTextEntry1] : Type: 2\par Severity: uncontrolled\par Duration: diagnosed April 2016\par Associated symptoms: hyperglycemia\par Modifying factors: worse due to nonadherence with medication\par \par Current meds for glycemic control:\par Metformin 1000 mg BID\par Tresiba 10 units daily\par Victoza 1.8 mg daily- never started because wife got in a car accident and hasn’t been able to pick it up.\par SMBG "whenever I remember." Reports since last visit, BG has been 120s to 140s. \par \par Complications:\par Last eye exam: unable to remember the last time he had an eye exam\par Last foot exam: never\par Kidney disease: none\par Heart disease: no CAD, CVA, MI\par \par Lifestyle\par Weight: loss, few pounds\par Exercise: jogging 2 miles 3x per week for the past 1.5 months.\par Diet: low carb, but has tried to add some carbs back in.\par Employed: physical job- unloads and loads trucks, works overnight \par

## 2020-09-24 NOTE — PHYSICAL EXAM
[Alert] : alert [Well Nourished] : well nourished [No Acute Distress] : no acute distress [Well Developed] : well developed [Normal Sclera/Conjunctiva] : normal sclera/conjunctiva [EOMI] : extra ocular movement intact [No Proptosis] : no proptosis [Thyroid Not Enlarged] : the thyroid was not enlarged [No Thyroid Nodules] : no palpable thyroid nodules [No Respiratory Distress] : no respiratory distress [No Accessory Muscle Use] : no accessory muscle use [Clear to Auscultation] : lungs were clear to auscultation bilaterally [Normal S1, S2] : normal S1 and S2 [Normal Rate] : heart rate was normal [Regular Rhythm] : with a regular rhythm [No Edema] : no peripheral edema [Normal Anterior Cervical Nodes] : no anterior cervical lymphadenopathy [No Stigmata of Cushings Syndrome] : no stigmata of Cushings Syndrome [Acanthosis Nigricans] : acanthosis nigricans present [Oriented x3] : oriented to person, place, and time [Normal Affect] : the affect was normal [Normal Mood] : the mood was normal

## 2020-10-07 ENCOUNTER — TRANSCRIPTION ENCOUNTER (OUTPATIENT)
Age: 40
End: 2020-10-07

## 2020-10-28 ENCOUNTER — APPOINTMENT (OUTPATIENT)
Dept: ENDOCRINOLOGY | Facility: CLINIC | Age: 40
End: 2020-10-28

## 2020-12-11 ENCOUNTER — APPOINTMENT (OUTPATIENT)
Dept: INTERNAL MEDICINE | Facility: CLINIC | Age: 40
End: 2020-12-11

## 2021-02-04 ENCOUNTER — APPOINTMENT (OUTPATIENT)
Dept: ENDOCRINOLOGY | Facility: CLINIC | Age: 41
End: 2021-02-04

## 2022-02-22 ENCOUNTER — EMERGENCY (EMERGENCY)
Facility: HOSPITAL | Age: 42
LOS: 1 days | Discharge: HOME | End: 2022-02-22
Attending: EMERGENCY MEDICINE
Payer: COMMERCIAL

## 2022-02-22 VITALS
DIASTOLIC BLOOD PRESSURE: 97 MMHG | HEIGHT: 73 IN | HEART RATE: 101 BPM | SYSTOLIC BLOOD PRESSURE: 200 MMHG | RESPIRATION RATE: 18 BRPM | OXYGEN SATURATION: 97 % | TEMPERATURE: 99 F

## 2022-02-22 PROCEDURE — 99283 EMERGENCY DEPT VISIT LOW MDM: CPT

## 2022-02-22 RX ORDER — IBUPROFEN 200 MG
600 TABLET ORAL ONCE
Refills: 0 | Status: COMPLETED | OUTPATIENT
Start: 2022-02-22 | End: 2022-02-22

## 2022-02-22 RX ORDER — IBUPROFEN 200 MG
1 TABLET ORAL
Qty: 28 | Refills: 0
Start: 2022-02-22 | End: 2022-02-28

## 2022-02-22 RX ORDER — ACETAMINOPHEN 500 MG
975 TABLET ORAL ONCE
Refills: 0 | Status: COMPLETED | OUTPATIENT
Start: 2022-02-22 | End: 2022-02-22

## 2022-02-22 RX ADMIN — Medication 600 MILLIGRAM(S): at 22:07

## 2022-02-22 RX ADMIN — Medication 300 MILLIGRAM(S): at 22:07

## 2022-02-22 RX ADMIN — Medication 975 MILLIGRAM(S): at 22:07

## 2022-02-22 NOTE — ED ADULT NURSE REASSESSMENT NOTE - NS ED NURSE REASSESS COMMENT FT1
Pt in no apparent distress at this time. Airway patent, breathing spontaneous and nonlabored. Pt A&Ox3 resting in stretcher. Pt c/o       , pain behind right ear. redness noted.

## 2022-02-22 NOTE — ED PROVIDER NOTE - PHYSICAL EXAMINATION
Gen: Nontoxic, well appearing, in NAD.  Skin: Warm and dry as visualized.   Head: NC/AT. Approx. 2cm x 3cm area of erythema and induration to right occipital region. Tender to touch. No fluctuance or crepitus.   Eyes: PERRLA. EOMI.   Neck: Supple, FROM. Trachea midline. No LAD.   Resp: No distress.  Cardio: Well perfused.  Ext: No deformities. MAEx4. FROM.   Neuro: A&Ox3. Appears nonfocal.   Psych: Normal affect and mood.

## 2022-02-22 NOTE — ED PROVIDER NOTE - NSFOLLOWUPINSTRUCTIONS_ED_ALL_ED_FT
- Prescription sent to pharmacy.  - Ibuprofen 600mg every 6 hours as needed for pain.  - Acetaminophen 650mg every 6 hours as needed for pain.   - Warm compresses.   - Please bring all documentation from your ED visit to any related future follow up appointment.  - Please call to schedule follow up appointment with your primary care physician within 24-48 hours.  - Please seek immediate medical attention or return to the ED for any new/worsening, signs/symptoms, or concerns.    Feel better!      Cellulitis    WHAT YOU NEED TO KNOW:    What is cellulitis? Cellulitis is a skin infection caused by bacteria. Cellulitis is common and can become severe. Cellulitis usually appears on the lower legs. It can also appear on the arms, face, and other areas. Cellulitis develops when bacteria enter a crack or break in your skin, such as a scratch, bite, or cut.    Cellulitis          What are the signs and symptoms of cellulitis? Signs and symptoms usually appear on one side of your body. You may have any of the following:  •A fever      •A red, warm, swollen area on your skin      •Pain when the area is touched      •Red spots, bumps, or blisters that may drain pus      •Bumpy, raised skin that feels like an orange peel      How is cellulitis diagnosed? Your healthcare provider may know you have cellulitis by looking at your skin. You may need blood tests to show what kind of bacteria are causing your infection. Other tests may be needed to see how much the infection has spread.    How is cellulitis treated? You should start to see improvement in 3 days. If your cellulitis is severe, you may need IV antibiotics in the hospital. If cellulitis is not treated, the infection can spread through your body and become life-threatening. You may need any of the following medicines:  •Antibiotics help treat the bacterial infection.      •Acetaminophen decreases pain and fever. It is available without a doctor's order. Ask how much to take and how often to take it. Follow directions. Read the labels of all other medicines you are using to see if they also contain acetaminophen, or ask your doctor or pharmacist. Acetaminophen can cause liver damage if not taken correctly. Do not use more than 4 grams (4,000 milligrams) total of acetaminophen in one day.       •NSAIDs, such as ibuprofen, help decrease swelling, pain, and fever. This medicine is available with or without a doctor's order. NSAIDs can cause stomach bleeding or kidney problems in certain people. If you take blood thinner medicine, always ask your healthcare provider if NSAIDs are safe for you. Always read the medicine label and follow directions.      How can I manage my symptoms?   •Wash the area with soap and water every day. Gently pat dry. Use bandages if directed by your healthcare provider.      •Elevate the area above the level of your heart as often as you can. This will help decrease swelling and pain. Prop the area on pillows or blankets to keep it elevated comfortably.  Elevate Leg           •Place a cool, damp cloth on the area. Use clean cloths and clean water. You can do this as often as you need to. Cool, damp cloths may help decrease pain.      •Apply cream or ointment as directed. These help protect the area. Most over-the-counter products, such as petroleum jelly, are good to use. Ask your healthcare provider about specific creams or ointments you should use.      How can I help prevent cellulitis?   •Do not scratch bug bites or areas of injury. You increase your risk for cellulitis by scratching these areas.      •Do not share personal items, such as towels, clothing, and razors.      •Clean exercise equipment with germ-killing  before and after you use it.      •Treat athlete’s foot. This can help prevent the spread of a bacterial skin infection.      •Wash your hands often. Use soap and water. Wash your hands after you use the bathroom, change a child's diapers, or sneeze. Wash your hands before you prepare or eat food. Use lotion to prevent dry, cracked skin.  Handwashing           When should I seek immediate care?   •Your wound gets larger and more painful.      •You feel a crackling under your skin when you touch it.      •You have purple dots or bumps on your skin, or you see bleeding under your skin.      •You see red streaks coming from the infected area.      When should I call my doctor?   •The red, warm, swollen area gets larger.      •Your fever or pain does not go away or gets worse.      •The area does not get smaller after 3 days of antibiotics.      •You have questions or concerns about your condition or care.      CARE AGREEMENT:    You have the right to help plan your care. Learn about your health condition and how it may be treated. Discuss treatment options with your healthcare providers to decide what care you want to receive. You always have the right to refuse treatment.

## 2022-02-22 NOTE — ED PROVIDER NOTE - CLINICAL SUMMARY MEDICAL DECISION MAKING FREE TEXT BOX
42 yo male PMHx NIDDM2 presents to ED for evaluation of head pain. Patient with large area of induration without drainable fluid collection. Abx, pain control, warm compresses. Outpatient follow up with PCP for skin check in 48 hours. Patient to be discharged. Patient provided verbal/written discharge instructions and return precautions. Patient expresses understanding and agreement.

## 2022-02-22 NOTE — ED PROVIDER NOTE - NSFOLLOWUPCLINICS_GEN_ALL_ED_FT
[facility] - [place]  Dermatology  [address] [place] [id number]  Phone: [phone]  Fax: [phone]

## 2022-02-22 NOTE — ED PROVIDER NOTE - PATIENT PORTAL LINK FT
You can access the FollowMyHealth Patient Portal offered by [facility] by registering at the following website: [email or web address]. By joining [employer]’s FollowMyHealth portal, you will also be able to view your health information using other applications (apps) compatible with our system.

## 2022-02-22 NOTE — ED PROVIDER NOTE - OBJECTIVE STATEMENT
42 yo male PMHx DM presents to ED c/o. 40 yo male PMHx NIDDM2 presents to ED for evaluation of head pain. Patient states 1 week ago developed small bump to back of right side of head. Tonight after waking up, states pain and swelling significantly worse prompting presentation to ED. Did not self medicate PTA. No further complaints at this time.   Denies injury, h/o abscess, fevers. 41 year old male PMHx NIDDM2 presents to ED for evaluation of head pain. Patient states 1 week ago developed small bump to back of right side of head. Tonight after waking up, states pain and swelling significantly worse prompting presentation to ED. Did not self medicate PTA. No further complaints at this time.   Denies injury, h/o abscess, fevers.

## 2022-02-23 ENCOUNTER — APPOINTMENT (OUTPATIENT)
Dept: DERMATOLOGY | Facility: CLINIC | Age: 42
End: 2022-02-23
Payer: COMMERCIAL

## 2022-02-23 PROCEDURE — 99203 OFFICE O/P NEW LOW 30 MIN: CPT

## 2023-03-28 NOTE — ED STATDOCS - SKIN, MLM
Ultrasound Used Text: Patient has a location which was not amenable to ultrasound. skin normal color for race, warm, dry and intact.

## 2023-05-10 ENCOUNTER — NON-APPOINTMENT (OUTPATIENT)
Age: 43
End: 2023-05-10

## 2023-05-31 ENCOUNTER — NON-APPOINTMENT (OUTPATIENT)
Age: 43
End: 2023-05-31

## 2023-06-09 LAB
HBA1C MFR BLD HPLC: 12.2
LDLC SERPL DIRECT ASSAY-MCNC: 128
MICROALBUMIN/CREAT UR-RTO: 83
TSH SERPL-ACNC: 0.77

## 2023-06-12 ENCOUNTER — NON-APPOINTMENT (OUTPATIENT)
Age: 43
End: 2023-06-12

## 2023-06-12 ENCOUNTER — RESULT CHARGE (OUTPATIENT)
Age: 43
End: 2023-06-12

## 2023-06-12 ENCOUNTER — APPOINTMENT (OUTPATIENT)
Dept: ENDOCRINOLOGY | Facility: CLINIC | Age: 43
End: 2023-06-12
Payer: COMMERCIAL

## 2023-06-12 VITALS
WEIGHT: 248 LBS | BODY MASS INDEX: 32.87 KG/M2 | SYSTOLIC BLOOD PRESSURE: 122 MMHG | HEIGHT: 73 IN | HEART RATE: 90 BPM | DIASTOLIC BLOOD PRESSURE: 72 MMHG

## 2023-06-12 LAB — GLUCOSE BLDC GLUCOMTR-MCNC: 233

## 2023-06-12 PROCEDURE — 82962 GLUCOSE BLOOD TEST: CPT

## 2023-06-12 PROCEDURE — 99214 OFFICE O/P EST MOD 30 MIN: CPT | Mod: 25

## 2023-06-12 RX ORDER — INSULIN GLARGINE 100 [IU]/ML
100 INJECTION, SOLUTION SUBCUTANEOUS DAILY
Qty: 1 | Refills: 1 | Status: ACTIVE | COMMUNITY
Start: 2023-06-12 | End: 1900-01-01

## 2023-06-12 RX ORDER — INSULIN DEGLUDEC INJECTION 100 U/ML
100 INJECTION, SOLUTION SUBCUTANEOUS DAILY
Qty: 1 | Refills: 1 | Status: DISCONTINUED | COMMUNITY
Start: 2019-06-13 | End: 2023-06-12

## 2023-06-12 RX ORDER — FLASH GLUCOSE SENSOR
KIT MISCELLANEOUS
Qty: 2 | Refills: 2 | Status: DISCONTINUED | COMMUNITY
Start: 2020-09-24 | End: 2023-06-12

## 2023-06-12 RX ORDER — FLUOXETINE HYDROCHLORIDE 40 MG/1
CAPSULE ORAL
Refills: 0 | Status: ACTIVE | COMMUNITY

## 2023-06-12 RX ORDER — LIRAGLUTIDE 6 MG/ML
18 INJECTION SUBCUTANEOUS DAILY
Qty: 1 | Refills: 2 | Status: DISCONTINUED | COMMUNITY
Start: 2020-09-24 | End: 2023-06-12

## 2023-06-12 NOTE — HISTORY OF PRESENT ILLNESS
[FreeTextEntry1] : INTERVAL HISTORY: Last seen in this office 2022. A1c May 2023 12.2%. At that time, he had been off all DM meds for about 6 months. PCP restarted Lantus and Metformin at that time. Needs A1c <10% or BG <200 for his job.\par \par Type: 2\par Severity: uncontrolled\par Duration: diagnosed 2016\par Associated symptoms: hyperglycemia\par Modifying factors: worse due to nonadherence with medication\par \par Current meds for glycemic control:\par Metformin 500 mg BID\par Lantus 5 units daily\par \par Complications:\par Last eye exam: none recent\par Last foot exam: May 2023, denies neuropathy\par Kidney disease: none\par Heart disease: no CAD, CVA, MI\par \par Lifestyle\par Weight: lost a few pounds in the past week\par Exercise: tries to walk or jog every once in a while\par Diet: eating a lot of salads, stopped drinking soda, stopped eating candy about 1-2 months ago.\par 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 Reports BG is usually 130s to 150s. \par Current B, ate two garlic knots 3 hours ago.

## 2023-06-12 NOTE — REVIEW OF SYSTEMS
[Recent Weight Loss (___ Lbs)] : recent weight loss: [unfilled] lbs [Polyuria] : polyuria [Chest Pain] : no chest pain [Palpitations] : no palpitations [Shortness Of Breath] : no shortness of breath [Nausea] : no nausea [Abdominal Pain] : no abdominal pain [Vomiting] : no vomiting [Pain/Numbness of Digits] : no pain/numbness of digits [Polydipsia] : no polydipsia

## 2023-06-12 NOTE — ASSESSMENT
[FreeTextEntry1] : 43 year old male with uncontrolled T2DM, hyperlipidemia, and hypertension. Glycemic control is poor due to nonadherence with medication regimen for 6 months.\par \par 1. T2DM- A1c 12.2% in May 2023, had been off DM meds x 6 months. PCP restarted meds 6 weeks ago.\par -Continue Metformin.\par -Increase Lantus to 12 units daily.\par -Start Mounjaro 2.5 mg weekly x 4 weeks, then increase to 5 mg weekly.\par -Stressed importance of lifestyle modifications- diet, exercise, and weight loss- to improve glycemic control.\par -Start SMBG with Freestyle Patricia 3.\par -Repeat A1c and RTO for follow-up in 6 weeks.\par - Glucose sensor necessity: This patient with diabetes performs four or more glucose checks per day utilizing a home blood glucose monitor. The patient is treated with insulin via three or more injections daily (or a subcutaneous insulin infusion pump). This patient requires frequent adjustments to their insulin treatment on the basis of therapeutic continuous glucose monitoring results. In addition, the patient has been to our office for an evaluation of their diabetes control within the past six months. \par \par 2. Hyperlipidemia- chol 190, \par -Continue atorvastatin.\par -Repeat lipids in 6 weeks. Can consider dose increase at next office visit.\par \par 3. Hypertension- controlled.\par -Continue ACE inhibitor.

## 2023-07-17 ENCOUNTER — RX RENEWAL (OUTPATIENT)
Age: 43
End: 2023-07-17

## 2023-07-19 ENCOUNTER — NON-APPOINTMENT (OUTPATIENT)
Age: 43
End: 2023-07-19

## 2023-07-24 ENCOUNTER — APPOINTMENT (OUTPATIENT)
Dept: ENDOCRINOLOGY | Facility: CLINIC | Age: 43
End: 2023-07-24
Payer: COMMERCIAL

## 2023-07-24 ENCOUNTER — RESULT CHARGE (OUTPATIENT)
Age: 43
End: 2023-07-24

## 2023-07-24 VITALS
SYSTOLIC BLOOD PRESSURE: 116 MMHG | WEIGHT: 244 LBS | HEIGHT: 73 IN | HEART RATE: 78 BPM | BODY MASS INDEX: 32.34 KG/M2 | DIASTOLIC BLOOD PRESSURE: 74 MMHG

## 2023-07-24 DIAGNOSIS — I10 ESSENTIAL (PRIMARY) HYPERTENSION: ICD-10-CM

## 2023-07-24 DIAGNOSIS — E55.9 VITAMIN D DEFICIENCY, UNSPECIFIED: ICD-10-CM

## 2023-07-24 DIAGNOSIS — E11.65 TYPE 2 DIABETES MELLITUS WITH HYPERGLYCEMIA: ICD-10-CM

## 2023-07-24 DIAGNOSIS — E78.5 HYPERLIPIDEMIA, UNSPECIFIED: ICD-10-CM

## 2023-07-24 LAB — GLUCOSE BLDC GLUCOMTR-MCNC: 208

## 2023-07-24 PROCEDURE — 95251 CONT GLUC MNTR ANALYSIS I&R: CPT

## 2023-07-24 PROCEDURE — 82962 GLUCOSE BLOOD TEST: CPT

## 2023-07-24 PROCEDURE — 99214 OFFICE O/P EST MOD 30 MIN: CPT | Mod: 25

## 2023-07-24 RX ORDER — TIRZEPATIDE 2.5 MG/.5ML
2.5 INJECTION, SOLUTION SUBCUTANEOUS
Qty: 1 | Refills: 0 | Status: DISCONTINUED | COMMUNITY
Start: 2023-06-12 | End: 2023-07-24

## 2023-07-24 RX ORDER — TIRZEPATIDE 5 MG/.5ML
5 INJECTION, SOLUTION SUBCUTANEOUS
Qty: 3 | Refills: 1 | Status: ACTIVE | COMMUNITY
Start: 2023-07-24 | End: 1900-01-01

## 2023-07-24 RX ORDER — BLOOD-GLUCOSE SENSOR
EACH MISCELLANEOUS
Qty: 2 | Refills: 2 | Status: ACTIVE | COMMUNITY
Start: 2023-06-12 | End: 1900-01-01

## 2023-07-24 NOTE — REVIEW OF SYSTEMS
[Recent Weight Loss (___ Lbs)] : recent weight loss: [unfilled] lbs [Chest Pain] : no chest pain [Palpitations] : no palpitations [Shortness Of Breath] : no shortness of breath [Nausea] : no nausea [Abdominal Pain] : no abdominal pain [Vomiting] : no vomiting [Polyuria] : no polyuria [Pain/Numbness of Digits] : no pain/numbness of digits [Polydipsia] : no polydipsia

## 2023-07-24 NOTE — ASSESSMENT
[FreeTextEntry1] : 43 year old male with uncontrolled T2DM, hyperlipidemia, and hypertension. Glycemic control is improving with diet changes and consistent use of medication.\par \par 1. T2DM- CGMS shows avg  with minimal variation.\par -Continue Lantus 10 units daily.\par -Continue Metformin.\par -Increase Mounjaro to 5 mg weekly.\par -Stressed importance of lifestyle modifications- diet, exercise, and weight loss- to improve glycemic control.\par -Continue SMBG with Freestyle Patricia 3.\par -Repeat A1c now.\par - Glucose sensor necessity: This patient with diabetes performs four or more glucose checks per day utilizing a home blood glucose monitor. The patient is treated with insulin via three or more injections daily (or a subcutaneous insulin infusion pump). This patient requires frequent adjustments to their insulin treatment on the basis of therapeutic continuous glucose monitoring results. In addition, the patient has been to our office for an evaluation of their diabetes control within the past six months. \par \par 2. Hyperlipidemia\par -Continue atorvastatin.\par -Repeat lipids now.\par \par 3. Hypertension- controlled.\par -Continue ACE inhibitor. \par \par 4. ED\par -Explained symptoms may be r/t uncontrolled diabetes.\par -Check testosterone level now. Reviewed indications for treatment. \par -PCP prescribed PDE5 inhibitor. Can disucss dose adjustment with PCP.

## 2023-07-24 NOTE — HISTORY OF PRESENT ILLNESS
[FreeTextEntry1] : INTERVAL HISTORY: Still working at [employer]. Made significant dietary changes and has been more consistent with his medication.\par \par Type: 2\par Severity: uncontrolled\par Duration: diagnosed 2016\par Associated symptoms: hyperglycemia\par Modifying factors: worse due to nonadherence with medication\par \par Current meds for glycemic control:\par Metformin 500 mg BID\par Mounjaro 5 mg weekly\par Lantus 10 units daily\par \par Complications:\par Last eye exam: none recent\par Last foot exam: May 2023, denies neuropathy\par Kidney disease: none\par Heart disease: no CAD, CVA, MI\par \par Lifestyle\par Weight: lost 80 pounds over the past few years\par Exercise: tries to walk or jog every once in a while\par Diet: eating a lot of salads, fruit. Stopped drinking soda, stopped eating candy about 1-2 months ago. Eliminated fast foods, staying away from carbs. 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 \par Avg \par CV 21.4%\par Very high 6%\par High 40%\par Target 54%\par Low 0%\par Very low 0%\par \par Interpretation: post prandial hyperglycemia most significant last week as patient's family was visiting from [place] and he was eating a lot of rice and beans. Minimal variation but generally higher than goal.

## 2023-10-23 ENCOUNTER — APPOINTMENT (OUTPATIENT)
Dept: ENDOCRINOLOGY | Facility: CLINIC | Age: 43
End: 2023-10-23

## 2023-11-16 NOTE — PHYSICAL EXAM
[No Acute Distress] : no acute distress [Well Nourished] : well nourished [Well Developed] : well developed [Well-Appearing] : well-appearing [Normal Sclera/Conjunctiva] : normal sclera/conjunctiva [PERRL] : pupils equal round and reactive to light [EOMI] : extraocular movements intact [Normal Outer Ear/Nose] : the outer ears and nose were normal in appearance [No Lymphadenopathy] : no lymphadenopathy [No JVD] : no jugular venous distention [Normal Oropharynx] : the oropharynx was normal [Thyroid Normal, No Nodules] : the thyroid was normal and there were no nodules present [Supple] : supple [No Respiratory Distress] : no respiratory distress  [No Accessory Muscle Use] : no accessory muscle use [Normal Rate] : normal rate  [Regular Rhythm] : with a regular rhythm [Clear to Auscultation] : lungs were clear to auscultation bilaterally [Normal S1, S2] : normal S1 and S2 [No Murmur] : no murmur heard [No Abdominal Bruit] : a ~M bruit was not heard ~T in the abdomen [No Carotid Bruits] : no carotid bruits [No Edema] : there was no peripheral edema [Pedal Pulses Present] : the pedal pulses are present [No Varicosities] : no varicosities [No Palpable Aorta] : no palpable aorta [No Extremity Clubbing/Cyanosis] : no extremity clubbing/cyanosis [Soft] : abdomen soft [Non Tender] : non-tender [Non-distended] : non-distended [No Masses] : no abdominal mass palpated [Normal Bowel Sounds] : normal bowel sounds [Normal Posterior Cervical Nodes] : no posterior cervical lymphadenopathy [No HSM] : no HSM [No CVA Tenderness] : no CVA  tenderness [Normal Anterior Cervical Nodes] : no anterior cervical lymphadenopathy [No Joint Swelling] : no joint swelling [No Spinal Tenderness] : no spinal tenderness [Grossly Normal Strength/Tone] : grossly normal strength/tone [No Rash] : no rash [No Focal Deficits] : no focal deficits [Coordination Grossly Intact] : coordination grossly intact [Normal Gait] : normal gait [Deep Tendon Reflexes (DTR)] : deep tendon reflexes were 2+ and symmetric [Normal Affect] : the affect was normal [Normal Insight/Judgement] : insight and judgment were intact full range of motion in all extremities

## 2024-01-23 ENCOUNTER — APPOINTMENT (OUTPATIENT)
Dept: ENDOCRINOLOGY | Facility: CLINIC | Age: 44
End: 2024-01-23